# Patient Record
Sex: FEMALE | Race: WHITE | NOT HISPANIC OR LATINO | Employment: UNEMPLOYED | URBAN - METROPOLITAN AREA
[De-identification: names, ages, dates, MRNs, and addresses within clinical notes are randomized per-mention and may not be internally consistent; named-entity substitution may affect disease eponyms.]

---

## 2021-03-09 ENCOUNTER — OFFICE VISIT (OUTPATIENT)
Dept: NEUROSURGERY | Facility: CLINIC | Age: 61
End: 2021-03-09

## 2021-03-09 VITALS
TEMPERATURE: 96.2 F | HEART RATE: 70 BPM | DIASTOLIC BLOOD PRESSURE: 88 MMHG | BODY MASS INDEX: 34.15 KG/M2 | HEIGHT: 67 IN | SYSTOLIC BLOOD PRESSURE: 126 MMHG | WEIGHT: 217.6 LBS | OXYGEN SATURATION: 100 % | RESPIRATION RATE: 20 BRPM

## 2021-03-09 DIAGNOSIS — M54.12 CERVICAL RADICULOPATHY: ICD-10-CM

## 2021-03-09 DIAGNOSIS — M50.30 DEGENERATION OF CERVICAL INTERVERTEBRAL DISC: ICD-10-CM

## 2021-03-09 DIAGNOSIS — E66.9 OBESITY, CLASS I, BMI 30-34.9: ICD-10-CM

## 2021-03-09 DIAGNOSIS — M51.36 DISC DEGENERATION, LUMBAR: ICD-10-CM

## 2021-03-09 DIAGNOSIS — M47.812 CERVICAL SPONDYLOSIS WITHOUT MYELOPATHY: ICD-10-CM

## 2021-03-09 DIAGNOSIS — M54.17 LUMBOSACRAL RADICULOPATHY: Primary | ICD-10-CM

## 2021-03-09 PROCEDURE — 99204 OFFICE O/P NEW MOD 45 MIN: CPT | Performed by: PHYSICIAN ASSISTANT

## 2021-03-09 RX ORDER — MELOXICAM 15 MG/1
15 TABLET ORAL DAILY
COMMUNITY
Start: 2021-03-03

## 2021-03-09 RX ORDER — GABAPENTIN 100 MG/1
100 CAPSULE ORAL 2 TIMES DAILY
COMMUNITY
Start: 2021-03-03

## 2021-03-09 RX ORDER — LISINOPRIL 5 MG/1
5 TABLET ORAL DAILY
COMMUNITY
Start: 2021-02-25

## 2021-03-09 NOTE — PROGRESS NOTES
Patient: Eli Lara  : 1960  Chart #: [id number]    Date of Service: 2021    CHIEF COMPLAINT:   1.  Neck, shoulder, and left arm pain  2.  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  She has no significant history of spinal difficulties.  About 8 months ago she developed some left shoulder pain.  This slowly progressed to include neck pain radiating all the way down the left arm to the hand.  Symptoms were described as severe.  Movement made pain worse. No alleviating factors.  Ultimately with time, her pain has gone away.  Today she has no neck or arm complaints at all.  About 1 month ago she developed some low back pain with radiation down the left leg to the lateral calf.  Symptoms are worse with movement.  Nothing alleviates leg pain.  No associated sensory alteration.  She was treated with some steroids which helped temporarily.  She is starting Neurontin 100 mg 3 times a day today.  She has not been to physical therapy yet.  She denies right-sided symptoms.  No bowel or bladder difficulties.  No focal weakness.      Past Medical History:   Diagnosis Date   • Arthritis    • Diabetes (CMS/HCC)    • Hearing loss    • Low back pain          Current Outpatient Medications:   •  gabapentin (NEURONTIN) 100 MG capsule, Take 100 mg by mouth 2 (Two) Times a Day., Disp: , Rfl:   •  lisinopril (PRINIVIL,ZESTRIL) 5 MG tablet, Take 5 mg by mouth Daily., Disp: , Rfl:   •  meloxicam (MOBIC) 15 MG tablet, Take 15 mg by mouth Daily., Disp: , Rfl:   •  metFORMIN (GLUCOPHAGE) 500 MG tablet, Take 500 mg by mouth 2 (Two) Times a Day., Disp: , Rfl:   •  rosuvastatin (CRESTOR) 5 MG tablet, Take 5 mg by mouth Daily., Disp: , Rfl:   •  vitamin B-12 (CYANOCOBALAMIN) 250 MCG tablet, Take 250 mcg by mouth Daily., Disp: , Rfl:     History reviewed. No pertinent surgical history.    Social History     Socioeconomic History   • Marital status:       Spouse name: Not on file   • Number of children: Not on file   • Years of education: Not on file   • Highest education level: Not on file   Tobacco Use   • Smoking status: Never Smoker   • Smokeless tobacco: Never Used   Vaping Use   • Vaping Use: Never used   Substance and Sexual Activity   • Alcohol use: Never   • Drug use: Never   • Sexual activity: Defer         Review of Systems   Constitutional: Negative for activity change, appetite change, chills, diaphoresis, fatigue, fever and unexpected weight change.   HENT: Negative for congestion, dental problem, drooling, ear discharge, ear pain, facial swelling, hearing loss, mouth sores, nosebleeds, postnasal drip, rhinorrhea, sinus pressure, sinus pain, sneezing, sore throat, tinnitus, trouble swallowing and voice change.    Eyes: Negative for photophobia, pain, discharge, redness, itching and visual disturbance.   Respiratory: Negative for apnea, cough, choking, chest tightness, shortness of breath, wheezing and stridor.    Cardiovascular: Negative for chest pain, palpitations and leg swelling.   Gastrointestinal: Negative for abdominal distention, abdominal pain, anal bleeding, blood in stool, constipation, diarrhea, nausea, rectal pain and vomiting.   Endocrine: Negative for cold intolerance, heat intolerance, polydipsia, polyphagia and polyuria.   Genitourinary: Negative for decreased urine volume, difficulty urinating, dyspareunia, dysuria, enuresis, flank pain, frequency, genital sores, hematuria, menstrual problem, pelvic pain, urgency, vaginal bleeding, vaginal discharge and vaginal pain.   Musculoskeletal: Positive for back pain. Negative for arthralgias, gait problem, joint swelling, myalgias, neck pain and neck stiffness.   Skin: Negative for color change, pallor, rash and wound.   Allergic/Immunologic: Negative for environmental allergies, food allergies and immunocompromised state.   Neurological: Negative for dizziness, tremors, seizures,  "syncope, facial asymmetry, speech difficulty, weakness, light-headedness, numbness and headaches.   Hematological: Negative for adenopathy. Does not bruise/bleed easily.   Psychiatric/Behavioral: Negative for agitation, behavioral problems, confusion, decreased concentration, dysphoric mood, hallucinations, self-injury, sleep disturbance and suicidal ideas. The patient is not nervous/anxious and is not hyperactive.        Objective   Vital Signs: Blood pressure 126/88, pulse 70, temperature 96.2 °F (35.7 °C), resp. rate 20, height 170.2 cm (67\"), weight 98.7 kg (217 lb 9.6 oz), SpO2 100 %.  Physical Exam  Vitals and nursing note reviewed.   Constitutional:       General: She is not in acute distress.     Appearance: She is well-developed.   HENT:      Head: Normocephalic and atraumatic.   Eyes:      Pupils: Pupils are equal, round, and reactive to light.   Cardiovascular:      Heart sounds: Normal heart sounds.   Pulmonary:      Breath sounds: Normal breath sounds.   Psychiatric:         Behavior: Behavior normal.         Thought Content: Thought content normal.     Musculoskeletal:     Strength is intact in upper and lower extremities to direct testing.     Station and gait are normal.     Straight leg raise is positive on the left at 15 degrees  Neurologic:     Muscle tone is normal throughout.     Coordination is intact.     Deep tendon reflexes: 2+ and symmetrical in all fields except left ankle which is absent     Sensation is intact to light touch throughout.     Patient is oriented to person, place, and time.     Abdullahi sign negative       Independent review of radiographic imaging: MRI of the lumbar spine demonstrates some cervical straightening.  There is multilevel degenerative disc disease and spondylosis most pronounced at C3-4 and C4-5 where there is leftward foraminal narrowing secondary to disc osteophyte complexes.  Additionally there is some moderate foraminal narrowing at C6-7.  I reviewed MRI " with patient in the room and answered all her questions.    Assessment/Plan   Diagnosis:  1.  Cervical spondylosis with left arm radiculopathy, improved  2.  Lumbar radiculopathy, left    Medical Decision Making: Fortunately patient's neck and arm symptoms have gone away completely.  Now she complains of low back and left leg symptoms consistent with a lumbar radiculopathy.  She said she is awaiting approval for a lumbar MRI.  I recommended a conservative course including formal physical therapy, nonsteroidal anti-inflammatories, and muscle relaxer if needed.  She is starting Neurontin today which should help.  After she has tried some therapy, if symptoms do not improve I would be happy to see her again with her MRI.  She will call our office.    Diagnoses and all orders for this visit:    1. Lumbosacral radiculopathy (Primary)    2. Disc degeneration, lumbar    3. Cervical radiculopathy    4. Degeneration of cervical intervertebral disc    5. Cervical spondylosis without myelopathy    6. Obesity, Class I, BMI 30-34.9                        Patient's Body mass index is 34.08 kg/m². BMI is above normal parameters. Recommendations include: exercise counseling and nutrition counseling.         Sharon Sánchez PA-C  Patient Care Team:  Anne Marie Roche PA as PCP - General (Physician Assistant)  Anne Marie Roche PA as Referring Physician (Physician Assistant)

## 2021-08-31 ENCOUNTER — OFFICE VISIT (OUTPATIENT)
Dept: NEUROSURGERY | Facility: CLINIC | Age: 61
End: 2021-08-31

## 2021-08-31 VITALS
WEIGHT: 209 LBS | DIASTOLIC BLOOD PRESSURE: 76 MMHG | TEMPERATURE: 97.1 F | HEIGHT: 67 IN | BODY MASS INDEX: 32.8 KG/M2 | SYSTOLIC BLOOD PRESSURE: 128 MMHG

## 2021-08-31 DIAGNOSIS — M54.16 LUMBAR RADICULOPATHY: Primary | ICD-10-CM

## 2021-08-31 PROCEDURE — 99214 OFFICE O/P EST MOD 30 MIN: CPT | Performed by: PHYSICIAN ASSISTANT

## 2021-08-31 RX ORDER — ROSUVASTATIN CALCIUM 10 MG/1
10 TABLET, COATED ORAL DAILY
COMMUNITY
Start: 2021-08-25

## 2021-08-31 RX ORDER — ERGOCALCIFEROL 1.25 MG/1
50000 CAPSULE ORAL WEEKLY
COMMUNITY
Start: 2021-08-23

## 2021-08-31 RX ORDER — ALBUTEROL SULFATE 2.5 MG/3ML
SOLUTION RESPIRATORY (INHALATION)
COMMUNITY

## 2021-08-31 RX ORDER — LANCETS 28 GAUGE
EACH MISCELLANEOUS 2 TIMES DAILY
COMMUNITY
Start: 2021-08-04

## 2021-08-31 RX ORDER — IPRATROPIUM BROMIDE AND ALBUTEROL SULFATE 2.5; .5 MG/3ML; MG/3ML
SOLUTION RESPIRATORY (INHALATION)
COMMUNITY
Start: 2021-05-29

## 2021-08-31 RX ORDER — EMPAGLIFLOZIN AND LINAGLIPTIN 10; 5 MG/1; MG/1
1 TABLET, FILM COATED ORAL EVERY MORNING
COMMUNITY
Start: 2021-08-24

## 2021-08-31 NOTE — PROGRESS NOTES
Patient: Eli Lara  : 1960  Gender: female    Primary Care Provider: Anne Marie Roche PA    Requesting Provider: As above    Chief Complaint: Low back and left leg pain    History of Present Illness:  Ms. Lara is a 61-year-old woman who was seen in our clinic in March of this year for neck and left shoulder pain.  At that time her symptoms had improved however she complained of new symptoms in her back and left lower extremity.  Patient states that back in March she developed pain in her low back radiating to her lateral left leg, extending to the lateral calf.  She did not do any physical therapy for her back pain as she contracted Covid several months ago.  She was prescribed Neurontin and feels that that helped.  Over the last several weeks she feels that her left leg pain has improved significantly.  Occasionally she will have some radiating pain however this is not constant.  Presently she feels that her condition is much improved.  She was able to get an MRI at that time which she presents with today.      Past Medical and Surgical History:  Past Medical History:   Diagnosis Date   • Arthritis    • COVID-19    • Diabetes (CMS/HCC)    • Hearing loss    • Low back pain      No past surgical history on file.    Current Medications:    Current Outpatient Medications:   •  albuterol (PROVENTIL/VENTOLIN) nebulization syringe, , Disp: , Rfl:   •  Glyxambi 10-5 MG tablet, Take 1 tablet by mouth Every Morning., Disp: , Rfl:   •  ipratropium-albuterol (DUO-NEB) 0.5-2.5 mg/3 ml nebulizer, USE 3 ML VIA NEBULIZER FOUR TIMES DAILY, Disp: , Rfl:   •  Lancets (freestyle) lancets, 2 (Two) Times a Day. as directed, Disp: , Rfl:   •  lisinopril (PRINIVIL,ZESTRIL) 5 MG tablet, Take 5 mg by mouth Daily., Disp: , Rfl:   •  meloxicam (MOBIC) 15 MG tablet, Take 15 mg by mouth Daily., Disp: , Rfl:   •  metFORMIN (GLUCOPHAGE) 500 MG tablet, Take 500 mg by mouth 2 (Two) Times a Day., Disp: , Rfl:   •  rosuvastatin  (CRESTOR) 10 MG tablet, Take 10 mg by mouth Daily., Disp: , Rfl:   •  vitamin B-12 (CYANOCOBALAMIN) 250 MCG tablet, Take 250 mcg by mouth Daily., Disp: , Rfl:   •  vitamin D (ERGOCALCIFEROL) 1.25 MG (71014 UT) capsule capsule, Take 50,000 Units by mouth 1 (One) Time Per Week., Disp: , Rfl:   •  gabapentin (NEURONTIN) 100 MG capsule, Take 100 mg by mouth 2 (Two) Times a Day., Disp: , Rfl:     Allergies:  Allergies   Allergen Reactions   • Penicillins Hives and Itching   • Biaxin [Clarithromycin] Rash         Review of Systems   Constitutional: Negative.  Negative for activity change, appetite change, chills, diaphoresis, fatigue, fever, unexpected weight gain and unexpected weight loss.   HENT: Negative.  Negative for congestion, dental problem, drooling, ear discharge, ear pain, facial swelling, hearing loss, mouth sores, nosebleeds, postnasal drip, rhinorrhea, sinus pressure, sneezing, sore throat, tinnitus, trouble swallowing and voice change.    Eyes: Negative.  Negative for blurred vision, double vision, photophobia, pain, discharge, redness, itching and visual disturbance.   Respiratory: Negative.  Negative for apnea, cough, choking, chest tightness, shortness of breath, wheezing and stridor.    Cardiovascular: Negative.  Negative for chest pain, palpitations and leg swelling.   Gastrointestinal: Negative.  Negative for abdominal distention, abdominal pain, anal bleeding, blood in stool, constipation, diarrhea, nausea, rectal pain, vomiting, GERD and indigestion.   Endocrine: Negative.  Negative for cold intolerance, heat intolerance, polydipsia, polyphagia and polyuria.   Genitourinary: Negative.  Negative for breast discharge, breast lump, breast pain, decreased libido, decreased urine volume, difficulty urinating, dyspareunia, dysuria, flank pain, frequency, genital sores, hematuria, menstrual problem, pelvic pain, urgency, urinary incontinence, vaginal bleeding, vaginal discharge and vaginal pain.  "  Musculoskeletal: Positive for back pain. Negative for arthralgias, gait problem, joint swelling, myalgias, neck pain, neck stiffness and bursitis.   Skin: Negative.  Negative for color change, dry skin, pallor, rash, skin lesions and bruise.   Allergic/Immunologic: Negative.  Negative for environmental allergies, food allergies and immunocompromised state.   Neurological: Negative.  Negative for dizziness, tremors, seizures, syncope, facial asymmetry, speech difficulty, weakness, light-headedness, numbness, headache, memory problem and confusion.   Hematological: Negative.  Negative for adenopathy. Does not bruise/bleed easily.   Psychiatric/Behavioral: Negative.  Negative for agitation, behavioral problems, decreased concentration, dysphoric mood, hallucinations, self-injury, sleep disturbance, suicidal ideas, negative for hyperactivity, depressed mood and stress. The patient is not nervous/anxious.    All other systems reviewed and are negative.        Physical Exam  Constitutional:       Appearance: Normal appearance.   Musculoskeletal:         General: Normal range of motion.      Cervical back: Normal range of motion and neck supple.   Skin:     General: Skin is warm and dry.   Neurological:      Mental Status: She is alert and oriented to person, place, and time.      Sensory: Sensation is intact.      Motor: Motor function is intact.      Coordination: Coordination is intact.      Gait: Gait is intact.   Psychiatric:         Mood and Affect: Mood normal.         Behavior: Behavior normal.           Vitals:    08/31/21 1020   BP: 128/76   BP Location: Right arm   Patient Position: Sitting   Cuff Size: Adult   Temp: 97.1 °F (36.2 °C)   TempSrc: Infrared   Weight: 94.8 kg (209 lb)   Height: 170.2 cm (67\")       Patient's Body mass index is 32.73 kg/m². indicating that she is obese (BMI >30).     Independent Review of Diagnostic Imaging:  MRI of the lumbar spine performed 3/29/2021 demonstrates a leftward disc " protrusion with small fragment that extends inferiorly contacting the left L4 nerve root.    Assessment:  1.  Left L4 radiculopathy, improved  2.  Lumbar disc herniation    Plan:  Ms. Lara is seen today in follow-up with a lumbar MRI.  This study is from several months ago when she developed back and left leg symptoms.  There is evidence of an extruded disc fragment at L3-4 on the left.  At this point her radicular symptoms have nearly completely resolved.  She has no new complaints today.  I will be happy to see her back if symptoms recur moving forward.        Cheryl Hall PA-C